# Patient Record
Sex: MALE | Race: WHITE
[De-identification: names, ages, dates, MRNs, and addresses within clinical notes are randomized per-mention and may not be internally consistent; named-entity substitution may affect disease eponyms.]

---

## 2017-01-23 ENCOUNTER — HOSPITAL ENCOUNTER (EMERGENCY)
Dept: HOSPITAL 10 - E/R | Age: 47
Discharge: HOME | End: 2017-01-23
Payer: COMMERCIAL

## 2017-01-23 VITALS
HEIGHT: 72 IN | BODY MASS INDEX: 25.83 KG/M2 | BODY MASS INDEX: 25.83 KG/M2 | WEIGHT: 190.7 LBS | HEIGHT: 72 IN | WEIGHT: 190.7 LBS

## 2017-01-23 VITALS — HEART RATE: 100 BPM | SYSTOLIC BLOOD PRESSURE: 147 MMHG | DIASTOLIC BLOOD PRESSURE: 79 MMHG | RESPIRATION RATE: 18 BRPM

## 2017-01-23 VITALS — TEMPERATURE: 99.3 F

## 2017-01-23 DIAGNOSIS — J06.9: Primary | ICD-10-CM

## 2017-01-23 DIAGNOSIS — R40.2362: ICD-10-CM

## 2017-01-23 DIAGNOSIS — R06.02: ICD-10-CM

## 2017-01-23 DIAGNOSIS — R40.2252: ICD-10-CM

## 2017-01-23 DIAGNOSIS — R40.2142: ICD-10-CM

## 2017-01-23 LAB
ADD UMIC: YES
ALBUMIN SERPL-MCNC: 4.4 G/DL (ref 3.3–4.9)
ALBUMIN/GLOB SERPL: 1.07 {RATIO}
ALP SERPL-CCNC: 87 IU/L (ref 42–121)
ALT SERPL-CCNC: 48 IU/L (ref 13–69)
ANION GAP SERPL CALC-SCNC: 17 MMOL/L (ref 8–16)
APTT BLD: 33.9 SEC (ref 25–35)
AST SERPL-CCNC: 43 IU/L (ref 15–46)
BASOPHILS # BLD AUTO: 0 10^3/UL (ref 0–0.1)
BASOPHILS NFR BLD: 0.3 % (ref 0–2)
BILIRUB DIRECT SERPL-MCNC: 0 MG/DL (ref 0–0.2)
BILIRUB SERPL-MCNC: 0.2 MG/DL (ref 0.2–1.3)
BUN SERPL-MCNC: 13 MG/DL (ref 7–20)
CALCIUM SERPL-MCNC: 8.5 MG/DL (ref 8.4–10.2)
CHLORIDE SERPL-SCNC: 98 MMOL/L (ref 97–110)
CO2 SERPL-SCNC: 29 MMOL/L (ref 21–31)
COLOR UR: YELLOW
CONDITION: 1
CREAT SERPL-MCNC: 0.69 MG/DL (ref 0.61–1.24)
EOSINOPHIL # BLD: 0 10^3/UL (ref 0–0.5)
EOSINOPHIL NFR BLD: 0.2 % (ref 0–7)
ERYTHROCYTE [DISTWIDTH] IN BLOOD BY AUTOMATED COUNT: 13.7 % (ref 11.5–14.5)
GLOBULIN SER-MCNC: 4.1 G/DL (ref 1.3–3.2)
GLUCOSE SERPL-MCNC: 127 MG/DL (ref 70–220)
GLUCOSE UR STRIP-MCNC: NEGATIVE %
HCT VFR BLD CALC: 44.1 % (ref 42–52)
HGB BLD-MCNC: 14.9 G/DL (ref 14–18)
INR PPP: 1.04
KETONES UR STRIP.AUTO-MCNC: NEGATIVE MG/DL
LYMPHOCYTES # BLD AUTO: 1 10^3/UL (ref 0.8–2.9)
LYMPHOCYTES NFR BLD AUTO: 10.7 % (ref 15–51)
MCH RBC QN AUTO: 30 PG (ref 29–33)
MCHC RBC AUTO-ENTMCNC: 33.8 G/DL (ref 32–37)
MCV RBC AUTO: 88.9 FL (ref 82–101)
MONOCYTES # BLD: 0.6 10^3/UL (ref 0.3–0.9)
MONOCYTES NFR BLD: 6.7 % (ref 0–11)
NEUTROPHILS # BLD: 7.5 10^3/UL (ref 1.6–7.5)
NEUTROPHILS NFR BLD AUTO: 82.1 % (ref 39–77)
NITRITE UR QL STRIP.AUTO: NEGATIVE
NRBC # BLD MANUAL: 0 10^3/UL (ref 0–0)
NRBC BLD QL: 0 /100WBC (ref 0–0)
PLATELET # BLD: 192 10^3/UL (ref 140–440)
PMV BLD AUTO: 7 FL (ref 7.4–10.4)
POTASSIUM SERPL-SCNC: 3.8 MMOL/L (ref 3.5–5.1)
PROT SERPL-MCNC: 8.5 G/DL (ref 6.1–8.1)
PROTHROMBIN TIME: 13.6 SEC (ref 12.2–14.2)
PT RATIO: 1.1
RBC # BLD AUTO: 4.96 10^6/UL (ref 4.7–6.1)
RBC # UR AUTO: (no result) /UL
RBC #/AREA URNS HPF: (no result) /HPF
SODIUM SERPL-SCNC: 140 MMOL/L (ref 135–144)
SQUAMOUS #/AREA URNS HPF: (no result) /[HPF]
URATE CRY #/AREA URNS HPF: (no result) /[HPF]
URINE BILIRUBIN (DIP): NEGATIVE
URINE TOTAL PROTEIN (DIP): (no result)
UROBILINOGEN UR STRIP-ACNC: (no result) (ref 0.1–1)
WBC # BLD AUTO: 9.1 10^3/UL (ref 4.8–10.8)
WBC # BLD: 9.1 10^3/UL (ref 4.8–10.8)
WBC # UR STRIP: NEGATIVE /UL

## 2017-01-23 PROCEDURE — 81001 URINALYSIS AUTO W/SCOPE: CPT

## 2017-01-23 PROCEDURE — 85730 THROMBOPLASTIN TIME PARTIAL: CPT

## 2017-01-23 PROCEDURE — 83605 ASSAY OF LACTIC ACID: CPT

## 2017-01-23 PROCEDURE — 93005 ELECTROCARDIOGRAM TRACING: CPT

## 2017-01-23 PROCEDURE — 85610 PROTHROMBIN TIME: CPT

## 2017-01-23 PROCEDURE — 81003 URINALYSIS AUTO W/O SCOPE: CPT

## 2017-01-23 PROCEDURE — 87400 INFLUENZA A/B EACH AG IA: CPT

## 2017-01-23 PROCEDURE — 85025 COMPLETE CBC W/AUTO DIFF WBC: CPT

## 2017-01-23 PROCEDURE — 80053 COMPREHEN METABOLIC PANEL: CPT

## 2017-01-23 PROCEDURE — 87040 BLOOD CULTURE FOR BACTERIA: CPT

## 2017-01-23 PROCEDURE — 36415 COLL VENOUS BLD VENIPUNCTURE: CPT

## 2017-01-23 PROCEDURE — 71010: CPT

## 2017-01-23 PROCEDURE — 84484 ASSAY OF TROPONIN QUANT: CPT

## 2017-01-23 PROCEDURE — 94664 DEMO&/EVAL PT USE INHALER: CPT

## 2017-01-23 PROCEDURE — 87086 URINE CULTURE/COLONY COUNT: CPT

## 2017-01-23 NOTE — RADRPT
PROCEDURE:   XR Chest. 

 

CLINICAL INDICATION:   SOB

 

TECHNIQUE:   Single frontal chest x-ray. 

 

COMPARISON:   12/01/2013 

 

FINDINGS:

The heart does not appear to be grossly enlarged. There is minimal prominence of the lung interstiti
um likely minimal chronic changes. Minimal patchy oval density measuring approximately 1.9 x 0.9 cm 
at the left lung base appearing since previous study which could represent minimal atelectasis or pn
eumonitis.  Degenerative changes in thoracic spine and shoulders.  ECG leads are projected over the 
chest.   

 

IMPRESSION:

 

Minimal patchy oval density measuring approximately 1.9 x 0.9 cm at the left lung base appearing sin
ce previous study which could represent minimal atelectasis or pneumonitis.  Follow-up is recommende
d to exclude lung nodule.

 

 

RPTAT: HJES

_____________________________________________ 

.Roberto Arita MD, MD           Date    Time 

Electronically viewed and signed by .Roberto Arita MD, MD on 01/23/2017 05:00 

 

D:  01/23/2017 05:00  T:  01/23/2017 05:00

.S/

## 2017-01-23 NOTE — ERD
ER Documentation


Chief Complaint


Date/Time


DATE: 1/23/17 


TIME: 05:13


Chief Complaint


fever/SOB/nausea/sore throat x 2 days.





HPI


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 

days.  Cough moderate of yellow sputum.  No chest pain.  No other current 

complaints.  No complaints of chills.





ROS


All systems reviewed and are negative except as per history of present illness.





Medications


Home Meds


Active Scripts


Docusate Sodium* (Colace*) 100 Mg Capsule, 100 MG PO TID, #30 CAP


   Prov:OMAR CALLAHAN NP         11/10/16


Polyethylene Glycol* (Miralax*) 17 Gm Powd.pack, 17 GM PO DAILY, #7


   Prov:OMAR CALLAHAN NP         11/10/16


Hydrocortisone Acetate (Anusol-Hc) 25 Mg Supp.rect, 1 SUPP [place] QHS Y for 

HEMORROID PAIN/ITCHING, #12 SUPP.RECT


   Prov:OMAR CALLAHAN NP         11/10/16


Amox Tr/Potassium Clavulanate (Amox Tr-K Clv 875-125 Mg Tab) 1 Tab Tablet, 1 

TAB PO BID for 7 Days, #20 TAB


   Prov:MYKEL MCCURDY MD         5/16/16


Hydrocortisone Acetate* (Anusol-HC*) 25 Mg/Supp.rect Supp.rect, 1 SUPP [place] BID Y 

for HEMORROID PAIN/ITCHING, #12 SUPP.RECT


   Prov:ANNA MARIE FLOOD DO         5/13/16


Reported Medications


Folic Acid* (Folic Acid*) 1 Mg Tablet, 1 MG PO DAILY, TAB


   5/14/16


Carbamazepine* (Carbamazepine*) 100 Mg Tab.chew, 400 MG PO TID, #90 TAB.CHEW


   5/14/16





Allergies


Allergies:  


Coded Allergies:  


     No Known Drug Allergies (Verified  Allergy, Mild, 4/5/11)





PMhx/Soc


History of Surgery:  Yes (s/p surgery to head )


Anesthesia Reaction:  No


Hx Neurological Disorder:  Yes (seizures since age 1)


Hx Respiratory Disorders:  Yes


Hx Cardiac Disorders:  No


Hx Psychiatric Problems:  No


Hx Miscellaneous Medical Probl:  No


Hx Alcohol Use:  No


Hx Substance Use:  No


Hx Tobacco Use:  No


Smoking Status:  Never smoker





Physical Exam


Vitals





Vital Signs








  Date Time  Temp Pulse Resp B/P Pulse Ox O2 Delivery O2 Flow Rate FiO2


 


1/23/17 04:31     97  2.0 


 


1/23/17 04:31  101 18  97 Nasal Cannula 2.0 


 


1/23/17 04:21      Nasal Cannula 3 


 


1/23/17 03:41 99.3 110  151/97 95 Room Air  


 


1/23/17 03:31 103.1 116 20 154/77 94   








Physical Exam


Const:  []


Head:   Atraumatic 


Eyes:    Normal Conjunctiva


ENT:    Normal External Ears, Nose and Mouth.


Neck:               Full range of motion..~ No meningismus.


Resp:    Clear to auscultation bilaterally


Cardio:    Regular rate and rhythm, no murmurs


Abd:    Soft, non tender, non distended. Normal bowel sounds


Skin:    No petechiae or rashes


Back:    No midline or flank tenderness


Ext:    No cyanosis, or edema


Neur:    Awake and alert


Psych:    Normal Mood and Affect


Result Diagram:  


1/23/17 0418                                                                   

             1/23/17 0418





Results 24 hrs





 Laboratory Tests








Test


  1/23/17


04:18 1/23/17


04:22


 


Activated Partial


Thromboplast Time 33.9Sec 


  


 


 


Alanine Aminotransferase


(ALT/SGPT) 48IU/L 


  


 


 


Albumin 4.4g/dl  


 


Albumin/Globulin Ratio 1.07  


 


Alkaline Phosphatase 87IU/L  


 


Anion Gap 17  


 


Aspartate Amino Transf


(AST/SGOT) 43IU/L 


  


 


 


Basophils # 0.010^3/ul  


 


Basophils % 0.3%  


 


Blood Morphology Comment   


 


Blood Urea Nitrogen 13mg/dl  


 


Calcium Level 8.5mg/dl  


 


Carbon Dioxide Level 29mmol/L  


 


Chloride Level 98mmol/L  


 


Creatinine 0.69mg/dl  


 


Direct Bilirubin 0.00mg/dl  


 


Eosinophils # 0.010^3/ul  


 


Eosinophils % 0.2%  


 


Globulin 4.10g/dl  


 


Glucose Level 127mg/dl  


 


Hematocrit 44.1%  


 


Hemoglobin 14.9g/dl  


 


INR International Normalized


Ratio 1.04 


  


 


 


Indirect Bilirubin 0.2mg/dl  


 


Lymphocytes # 1.010^3/ul  


 


Lymphocytes % 10.7%  


 


Mean Corpuscular Hemoglobin 30.0pg  


 


Mean Corpuscular Hemoglobin


Concent 33.8g/dl 


  


 


 


Mean Corpuscular Volume 88.9fl  


 


Mean Platelet Volume 7.0fl  


 


Monocytes # 0.610^3/ul  


 


Monocytes % 6.7%  


 


Neutrophils # 7.510^3/ul  


 


Neutrophils % 82.1%  


 


Nucleated Red Blood Cells # 0.010^3/ul  


 


Nucleated Red Blood Cells % 0.0/100WBC  


 


Platelet Count 54554^3/UL  


 


Potassium Level 3.8mmol/L  


 


Prothrombin Time 13.6Sec  


 


Prothrombin Time Ratio 1.1  


 


Red Blood Count 4.9610^6/ul  


 


Red Cell Distribution Width 13.7%  


 


Sodium Level 140mmol/L  


 


Total Bilirubin 0.2mg/dl  


 


Total Protein 8.5g/dl  


 


White Blood Count 9.110^3/ul  


 


Urine Bilirubin  NEGATIVE 


 


Urine Clarity  CLEAR 


 


Urine Color  YELLOW 


 


Urine Glucose  NEGATIVE% 


 


Urine Hemoglobin  TRACE 


 


Urine Ketones  NEGATIVE 


 


Urine Leukocyte Esterase  NEGATIVE 


 


Urine Microscopic RBC  Pending 


 


Urine Microscopic WBC  Pending 


 


Urine Nitrite  NEGATIVE 


 


Urine Specific Gravity  1.025 


 


Urine Total Protein  TRACE 


 


Urine Urobilinogen  0.2  E.U./dL 


 


Urine pH  5.0 








 Current Medications








 Medications


  (Trade)  Dose


 Ordered  Sig/Esthela


 Route


 PRN Reason  Start Time


 Stop Time Status Last Admin


Dose Admin


 


 Albuterol


  (Proventil 0.5%


  (Neb))  5 mg  ONCE  STAT


 INH


   1/23/17 04:01


 1/23/17 04:15 DC 1/23/17 04:24


 


 


 Ipratropium


 Bromide


  (Atrovent 0.02%


  (Neb))  0.5 mg  ONCE  STAT


 INH


   1/23/17 04:01


 1/23/17 04:15 DC 1/23/17 04:24


 


 


 Sodium Chloride


  (NS)  2,680 ml  BOLUS OVER 2 HOURS STAT


 IV*


   1/23/17 04:03


 1/23/17 04:15 DC 1/23/17 04:25


 











Procedures/MDM


Medical decision-making: This very pleasant 46-year-old male as well as speech 

bronchitis.  At this point is clinically stable.  We discharged with 

azithromycin, prednisone, albuterol.  Patient to follow-up with primary care 

physician.  Return immediately for return of symptoms.  Follow with primary 

care physician within 24 hours.





Departure


Diagnosis:  


 Primary Impression:  


 Upper respiratory infection


 URI type:  unspecified URI  Qualified Code:  J06.9 - Upper respiratory tract 

infection, unspecified type


Condition:  Stable











DILLON HURTADO Jan 23, 2017 05:13

## 2017-03-09 ENCOUNTER — HOSPITAL ENCOUNTER (EMERGENCY)
Dept: HOSPITAL 10 - E/R | Age: 47
LOS: 1 days | Discharge: HOME | End: 2017-03-10
Payer: COMMERCIAL

## 2017-03-09 VITALS
WEIGHT: 190.7 LBS | HEIGHT: 72 IN | BODY MASS INDEX: 25.83 KG/M2 | BODY MASS INDEX: 25.83 KG/M2 | HEIGHT: 72 IN | WEIGHT: 190.7 LBS

## 2017-03-09 DIAGNOSIS — K64.9: Primary | ICD-10-CM

## 2017-03-09 PROCEDURE — 86900 BLOOD TYPING SEROLOGIC ABO: CPT

## 2017-03-09 PROCEDURE — 80053 COMPREHEN METABOLIC PANEL: CPT

## 2017-03-09 PROCEDURE — 96374 THER/PROPH/DIAG INJ IV PUSH: CPT

## 2017-03-09 PROCEDURE — 85025 COMPLETE CBC W/AUTO DIFF WBC: CPT

## 2017-03-09 PROCEDURE — 86850 RBC ANTIBODY SCREEN: CPT

## 2017-03-09 PROCEDURE — 81003 URINALYSIS AUTO W/O SCOPE: CPT

## 2017-03-09 PROCEDURE — 83690 ASSAY OF LIPASE: CPT

## 2017-03-09 PROCEDURE — 86901 BLOOD TYPING SEROLOGIC RH(D): CPT

## 2017-03-10 VITALS
SYSTOLIC BLOOD PRESSURE: 131 MMHG | RESPIRATION RATE: 20 BRPM | DIASTOLIC BLOOD PRESSURE: 65 MMHG | TEMPERATURE: 98.5 F | HEART RATE: 86 BPM

## 2017-03-10 LAB
ADD SCAN DIFF: NO
ALBUMIN SERPL-MCNC: 4.2 G/DL (ref 3.3–4.9)
ALBUMIN/GLOB SERPL: 1.27 {RATIO}
ALP SERPL-CCNC: 88 IU/L (ref 42–121)
ALT SERPL-CCNC: 48 IU/L (ref 13–69)
ANION GAP SERPL CALC-SCNC: 17 MMOL/L (ref 8–16)
AST SERPL-CCNC: 43 IU/L (ref 15–46)
BILIRUB DIRECT SERPL-MCNC: 0 MG/DL (ref 0–0.2)
BILIRUB SERPL-MCNC: 0.1 MG/DL (ref 0.2–1.3)
BUN SERPL-MCNC: 17 MG/DL (ref 7–20)
CALCIUM SERPL-MCNC: 8.6 MG/DL (ref 8.4–10.2)
CHLORIDE SERPL-SCNC: 103 MMOL/L (ref 97–110)
CO2 SERPL-SCNC: 27 MMOL/L (ref 21–31)
CREAT SERPL-MCNC: 0.76 MG/DL (ref 0.61–1.24)
EOSINOPHIL # BLD: 0.1 10^3/UL (ref 0–0.5)
EOSINOPHIL NFR BLD: 2 % (ref 0–7)
ERYTHROCYTE [DISTWIDTH] IN BLOOD BY AUTOMATED COUNT: 13.2 % (ref 11.5–14.5)
GLOBULIN SER-MCNC: 3.3 G/DL (ref 1.3–3.2)
GLUCOSE SERPL-MCNC: 93 MG/DL (ref 70–220)
HCT VFR BLD CALC: 43.7 % (ref 42–52)
HGB BLD-MCNC: 14.3 G/DL (ref 14–18)
LYMPHOCYTES # BLD AUTO: 3.5 10^3/UL (ref 0.8–2.9)
LYMPHOCYTES NFR BLD AUTO: 56 % (ref 15–51)
MCH RBC QN AUTO: 29.9 PG (ref 29–33)
MCHC RBC AUTO-ENTMCNC: 32.7 G/DL (ref 32–37)
MCV RBC AUTO: 91.2 FL (ref 82–101)
MONOCYTES # BLD: 0.1 10^3/UL (ref 0.3–0.9)
MONOCYTES NFR BLD: 1 % (ref 0–11)
NEUTROPHILS # BLD: 2.6 10^3/UL (ref 1.6–7.5)
NEUTROPHILS NFR BLD AUTO: 41 % (ref 39–77)
PLATELET # BLD: 224 10^3/UL (ref 140–415)
PMV BLD AUTO: 8.6 FL (ref 7.4–10.4)
POTASSIUM SERPL-SCNC: 3.6 MMOL/L (ref 3.5–5.1)
PROT SERPL-MCNC: 7.5 G/DL (ref 6.1–8.1)
RBC # BLD AUTO: 4.79 10^6/UL (ref 4.7–6.1)
SODIUM SERPL-SCNC: 143 MMOL/L (ref 135–144)
TOTAL CELLS COUNTED PERCENT: 100 %
WBC # BLD AUTO: 6.3 10^3/UL (ref 4.8–10.8)

## 2017-03-10 NOTE — ERA
ER Documentation


Chief Complaint


Date/Time


DATE: 3/10/17 


TIME: 00:37


Chief Complaint


mid abd pain w/ bloody stools x 4 days





HPI


The patient is a 46-year-old male, presenting to the ER because of bloody stool 

for the last 4 days, associated with constipation.  He had similar symptoms 

previously due to constipation.  He had colonoscopy in May 2016 that showed 

internal and external hemorrhoids.  He denies fever, chills, dizziness, neck 

pain, chest pain, dyspnea, abdominal pain, vomiting, dysuria.  He does not 

smoke or drink





Past medical history: Internal and external hemorrhoids


Past surgical history: None





ROS


All systems reviewed and are negative except as per history of present illness.





Medications


Home Meds


Active Scripts


Hydrocortisone Acetate (Anusol-Hc) 25 Mg Supp.rect, 1 SUPP [place] BID Y for 

HEMORROID PAIN/ITCHING, #12 SUPP.RECT


   Prov:JAIME VIDAL MD         3/10/17


Polyethylene Glycol* (Miralax*) 17 Gm Powd.pack, 17 GM PO DAILY, #7


   Prov:JAIME VIDAL MD         3/10/17


Dextromethorphan Hb-Promethazine Hcl (Promethazine DM Syrup) 473 Ml Syrup, 5 ML 

PO Q6H Y for COUGH, #4 OZ


   Prov:DILLON HURTADO         1/23/17


Albuterol Sulfate* (Ventolin HFA*) 18 Gm Hfa.aer.ad, 2 PUFF INHALATION Q4H, #1 

INHALER


   Prov:DILLON HURTADO         1/23/17


Prednisone* (Prednisone*) 20 Mg Tab, 40 MG PO DAILY for 4 Days, TAB


   Prov:DILLON HURTADO         1/23/17


Azithromycin* (Zithromax*) 250 Mg Tablet, 250 MG PO .ZPACK AS DIRECTED, #6 TAB


   TAKE 500 MG (2 TABS) THE FIRST DAY THEN 250 MG (1 TAB) DAYS 2-5


   Prov:DILLON HURTADO         1/23/17


Docusate Sodium* (Colace*) 100 Mg Capsule, 100 MG PO TID, #30 CAP


   Prov:OMAR CALLAHAN NP         11/10/16


Polyethylene Glycol* (Miralax*) 17 Gm Powd.pack, 17 GM PO DAILY, #7


   Prov:OMAR CALLAHAN NP         11/10/16


Hydrocortisone Acetate (Anusol-Hc) 25 Mg Supp.rect, 1 SUPP [place] QHS Y for 

HEMORROID PAIN/ITCHING, #12 SUPP.RECT


   Prov:OMAR CALLAHAN NP         11/10/16


Amox Tr/Potassium Clavulanate (Amox Tr-K Clv 875-125 Mg Tab) 1 Tab Tablet, 1 

TAB PO BID for 7 Days, #20 TAB


   Prov:MYKEL MCCURDY MD         5/16/16


Hydrocortisone Acetate* (Anusol-HC*) 25 Mg/Supp.rect Supp.rect, 1 SUPP [place] BID Y 

for HEMORROID PAIN/ITCHING, #12 SUPP.RECT


   Prov:ANNA MARIE FLOOD DO         5/13/16


Reported Medications


Folic Acid* (Folic Acid*) 1 Mg Tablet, 1 MG PO DAILY, TAB


   5/14/16


Carbamazepine* (Carbamazepine*) 100 Mg Tab.chew, 400 MG PO TID, #90 TAB.CHEW


   5/14/16





Allergies


Allergies:  


Coded Allergies:  


     No Known Drug Allergies (Verified  Allergy, Mild, 4/5/11)





PMhx/Soc


History of Surgery:  Yes (s/p surgery to head )


Anesthesia Reaction:  No


Hx Neurological Disorder:  Yes (seizures since age 1)


Hx Respiratory Disorders:  Yes


Hx Cardiac Disorders:  No


Hx Psychiatric Problems:  No


Hx Miscellaneous Medical Probl:  No


Hx Alcohol Use:  No


Hx Substance Use:  No


Hx Tobacco Use:  No


Smoking Status:  Never smoker





Physical Exam


Vitals





Vital Signs








  Date Time  Temp Pulse Resp B/P Pulse Ox O2 Delivery O2 Flow Rate FiO2


 


3/9/17 23:41 98.7 75 20 133/78 98   








Physical Exam


 Const:      No acute distress.


 Head:        Atraumatic.


 Eyes:       Normal Conjunctiva.


 ENT:         Normal External Ears, Nose and Mouth.


 Neck:        Full range of motion.  No meningismus.


 Resp:         Clear to auscultation bilaterally.


 Cardio:       Regular rate and rhythm, no murmurs.


 Abd:         Soft,  non distended, normal bowel sounds, non tender.


 Skin:         No petechiae or rashes.


 Back:        No midline or flank tenderness.


 Ext:          No cyanosis, or edema.


 Neur:        Awake and alert. No focal deficit


 Psych:        Normal Mood and Affect.


Result Diagram:  


3/10/17 0100                                                                   

             3/10/17 0100





Results 24 hrs








 Laboratory Tests








Test


  3/10/17


01:00 3/10/17


02:13


 


Alanine Aminotransferase


(ALT/SGPT) 48IU/L 


  


 


 


Albumin 4.2g/dl  


 


Albumin/Globulin Ratio 1.27  


 


Alkaline Phosphatase 88IU/L  


 


Anion Gap 17  


 


Aspartate Amino Transf


(AST/SGOT) 43IU/L 


  


 


 


Blood Urea Nitrogen 17mg/dl  


 


Calcium Level 8.6mg/dl  


 


Carbon Dioxide Level 27mmol/L  


 


Chloride Level 103mmol/L  


 


Creatinine 0.76mg/dl  


 


Direct Bilirubin 0.00mg/dl  


 


Eosinophils # 0.110^3/ul  


 


Eosinophils % 2.0%  


 


Globulin 3.30g/dl  


 


Glucose Level 93mg/dl  


 


Hematocrit 43.7%  


 


Hemoglobin 14.3g/dl  


 


Indirect Bilirubin 0.1mg/dl  


 


Lipase 74U/L  


 


Lymphocytes # 3.510^3/ul  


 


Lymphocytes % 56.0%  


 


Mean Corpuscular Hemoglobin 29.9pg  


 


Mean Corpuscular Hemoglobin


Concent 32.7g/dl 


  


 


 


Mean Corpuscular Volume 91.2fl  


 


Mean Platelet Volume 8.6fl  


 


Monocytes # 0.110^3/ul  


 


Monocytes % 1.0%  


 


Neutrophils # 2.610^3/ul  


 


Neutrophils % 41.0%  


 


Platelet Count 35336^3/UL  


 


Potassium Level 3.6mmol/L  


 


Red Blood Count 4.7910^6/ul  


 


Red Cell Distribution Width 13.2%  


 


Sodium Level 143mmol/L  


 


Total Bilirubin 0.1mg/dl  


 


Total Protein 7.5g/dl  


 


White Blood Count 6.310^3/ul  


 


Bedside Urine Blood  Negative 


 


Bedside Urine Glucose (UA)  Negative 


 


Bedside Urine Ketones (LAB)  Negative 


 


Bedside Urine Leukocyte


Esterase (L 


  Negative 


 


 


Bedside Urine Nitrite (LAB)  Negative 


 


Bedside Urine Protein (LAB)  1+ 


 


Bedside Urine pH (LAB)  5.5 








 Current Medications








 Medications


  (Trade)  Dose


 Ordered  Sig/Esthela


 Route


 PRN Reason  Start Time


 Stop Time Status Last Admin


Dose Admin


 


 Ketorolac


 Tromethamine


  (Toradol)  30 mg  ONCE  STAT


 IV


   3/10/17 02:04


 3/10/17 02:05 DC 3/10/17 03:20


 














Procedures/MDM


MEDICAL MAKING DECISION: The patient is a 46-year-old male, presenting with 

acute hematochezia due to constipation and hemorrhoids.  He was treated with 

Toradol 30 mg IV for pain with good response.  The differential diagnoses 

considered include but are not limited to cholelithiasis, cholecystitis, 

cystitis, pancreatitis, hepatitis, gastritis, peptic ulcer disease, gastric 

ulcer, appendicitis, diverticulitis, cholangitis, choledocholithiasis, partial 

small bowel obstruction,  carcinoma, polyp, hemorrhoid, fissure, diverticulosis

, angiodysplasia.





Departure


Diagnosis:  


 Primary Impression:  


 Hemorrhoids


 Additional Impression:  


 Constipation


Comments


He was discharged with Anusol suppository and cream and MiraLAX





I discussed the findings with the patient. I advised the patient to follow-up 

with the primary physician in about 1-2 days for referral to see a general 

surgeon for elective hemorrhoidectomy, sooner if needed and return if any 

concern.





The patient's blood pressure was elevated (>120/80) but appears stable without 

evidence of hypertension emergency or urgency.  The patient was counseled about 

the risks of hypertension and urged to pursue outpatient monitoring and therapy 

within a week with their primary care physician.











JAIME VIDAL MD Mar 10, 2017 00:37

## 2019-08-08 ENCOUNTER — HOSPITAL ENCOUNTER (EMERGENCY)
Dept: HOSPITAL 91 - E/R | Age: 49
LOS: 1 days | Discharge: ANOTHER HOSPITAL | End: 2019-08-09
Payer: COMMERCIAL

## 2019-08-08 ENCOUNTER — HOSPITAL ENCOUNTER (EMERGENCY)
Dept: HOSPITAL 10 - E/R | Age: 49
LOS: 1 days | Discharge: ANOTHER HOSPITAL | End: 2019-08-09
Payer: COMMERCIAL

## 2019-08-08 VITALS
HEIGHT: 68 IN | BODY MASS INDEX: 27.2 KG/M2 | WEIGHT: 179.46 LBS | WEIGHT: 179.46 LBS | HEIGHT: 68 IN | BODY MASS INDEX: 27.2 KG/M2

## 2019-08-08 DIAGNOSIS — R40.2362: ICD-10-CM

## 2019-08-08 DIAGNOSIS — R40.2142: ICD-10-CM

## 2019-08-08 DIAGNOSIS — I10: ICD-10-CM

## 2019-08-08 DIAGNOSIS — R40.2252: ICD-10-CM

## 2019-08-08 DIAGNOSIS — R26.0: ICD-10-CM

## 2019-08-08 DIAGNOSIS — I63.9: Primary | ICD-10-CM

## 2019-08-08 LAB
ADD MAN DIFF?: YES
ALANINE AMINOTRANSFERASE: 53 IU/L (ref 13–69)
ALBUMIN/GLOBULIN RATIO: 1.31
ALBUMIN: 4.2 G/DL (ref 3.3–4.9)
ALKALINE PHOSPHATASE: 81 IU/L (ref 42–121)
ANION GAP: 4 (ref 5–13)
ANISOCYTOSIS: (no result) (ref 0–0)
ASPARTATE AMINO TRANSFERASE: 40 IU/L (ref 15–46)
BAND NEUTROPHILS #M: 0 10^3/UL (ref 0–0.6)
BAND NEUTROPHILS % (M): 1 % (ref 0–4)
BILIRUBIN,DIRECT: 0 MG/DL (ref 0–0.2)
BILIRUBIN,TOTAL: 0.3 MG/DL (ref 0.2–1.3)
BLOOD UREA NITROGEN: 9 MG/DL (ref 7–20)
CALCIUM: 8.9 MG/DL (ref 8.4–10.2)
CARBON DIOXIDE: 31 MMOL/L (ref 21–31)
CHLORIDE: 103 MMOL/L (ref 97–110)
CREATININE: 0.55 MG/DL (ref 0.61–1.24)
EOSINOPHILS % (M): 2 % (ref 0–7)
GIANT THROMBO% (M): 1 % (ref 0–0)
GLOBULIN: 3.2 G/DL (ref 1.3–3.2)
GLUCOSE: 93 MG/DL (ref 70–220)
HEMATOCRIT: 43.7 % (ref 42–52)
HEMOGLOBIN: 14.3 G/DL (ref 14–18)
IMMATURE GRANS #M: 0.01 10^3/UL (ref 0–0.03)
IMMATURE GRANS % (M): 0.2 % (ref 0–0.43)
LIPASE: 72 U/L (ref 23–300)
LYMPHOCYTES #M: 2.2 10^3/UL (ref 0.8–2.9)
LYMPHOCYTES % (M): 45 % (ref 15–51)
MACROCYTOSIS: (no result) (ref 0–0)
MEAN CORPUSCULAR HEMOGLOBIN: 30 PG (ref 29–33)
MEAN CORPUSCULAR HGB CONC: 32.7 G/DL (ref 32–37)
MEAN CORPUSCULAR VOLUME: 91.6 FL (ref 82–101)
MEAN PLATELET VOLUME: 8.3 FL (ref 7.4–10.4)
MONOCYTE #M: 0.1 10^3/UL (ref 0.3–0.9)
MONOCYTES % (M): 3 % (ref 0–11)
NUCLEATED RED BLOOD CELLS%: 0 /100WBC (ref 0–0)
PLATELET COUNT: 221 10^3/UL (ref 140–415)
PLATELET ESTIMATE: NORMAL
POIKILOCYTOSIS: (no result) (ref 0–0)
POLYCHROMASIA: (no result) (ref 0–0)
POTASSIUM: 4.3 MMOL/L (ref 3.5–5.1)
REACTIVE LYMPHOCYTES #M: 0.7 10^3/UL (ref 0–0)
REACTIVE LYMPHOCYTES% (M): 15 % (ref 0–0)
RED BLOOD COUNT: 4.77 10^6/UL (ref 4.7–6.1)
RED CELL DISTRIBUTION WIDTH: 12.6 % (ref 11.5–14.5)
SEG NEUT #M: 1.7 10^3/UL (ref 1.6–7.5)
SEGMENTED NEUTROPHILS (M) %: 34 % (ref 39–77)
SMUDGE%M: 4 % (ref 0–0)
SODIUM: 138 MMOL/L (ref 135–144)
TOTAL PROTEIN: 7.4 G/DL (ref 6.1–8.1)
TROPONIN-I: < 0.012 NG/ML (ref 0–0.12)
WHITE BLOOD COUNT: 4.9 10^3/UL (ref 4.8–10.8)

## 2019-08-08 PROCEDURE — 80053 COMPREHEN METABOLIC PANEL: CPT

## 2019-08-08 PROCEDURE — 84484 ASSAY OF TROPONIN QUANT: CPT

## 2019-08-08 PROCEDURE — 70450 CT HEAD/BRAIN W/O DYE: CPT

## 2019-08-08 PROCEDURE — 83690 ASSAY OF LIPASE: CPT

## 2019-08-08 PROCEDURE — 93005 ELECTROCARDIOGRAM TRACING: CPT

## 2019-08-08 PROCEDURE — 96374 THER/PROPH/DIAG INJ IV PUSH: CPT

## 2019-08-08 PROCEDURE — 36415 COLL VENOUS BLD VENIPUNCTURE: CPT

## 2019-08-08 PROCEDURE — 85025 COMPLETE CBC W/AUTO DIFF WBC: CPT

## 2019-08-08 PROCEDURE — 71045 X-RAY EXAM CHEST 1 VIEW: CPT

## 2019-08-08 PROCEDURE — 99285 EMERGENCY DEPT VISIT HI MDM: CPT

## 2019-08-08 RX ADMIN — THIAMINE HYDROCHLORIDE 1 MLS/HR: 100 INJECTION, SOLUTION INTRAMUSCULAR; INTRAVENOUS at 20:40

## 2019-08-08 RX ADMIN — ONDANSETRON HYDROCHLORIDE 1 MG: 2 INJECTION, SOLUTION INTRAMUSCULAR; INTRAVENOUS at 20:40

## 2019-08-08 RX ADMIN — ASPIRIN 81 MG CHEWABLE TABLET 1 MG: 81 TABLET CHEWABLE at 22:38

## 2019-08-08 NOTE — ERD
ER Documentation


Chief Complaint


Chief Complaint





DIZZINESS AND DOUBLE-VISION X 2 DAYS.





HPI


This is a 49-year-old man with history of seizure disorder complaining of 2 days


of generalized weakness, dizziness, blurry vision and some nausea.  He had a 


tonic-clonic seizure episode a few days ago but states he has been using his 


medications as prescribed, which include carbamazepine and lamotrigine.  He 


states he usually has about one seizure every 1 to 2 months.  He denies fevers 


or chills, no blood per rectum or melena, no URI symptoms, no complaints of neck


or back pain, no chest pain or shortness of breath.  Patient denies loss of 


consciousness.





ROS


All systems reviewed and are negative except as per history of present illness.





Medications


Home Meds


Active Scripts


Hydrocortisone Acetate (Anusol-Hc) 25 Mg Supp.rect, 1 SUPP IA BID PRN for 


HEMORROID PAIN/ITCHING, #12 SUPP.RECT


   Prov:JAIME VIDAL MD         3/10/17


Polyethylene Glycol* (Miralax*) 17 Gm Powd.pack, 17 GM PO DAILY, #7


   Prov:JAIME VIDAL MD         3/10/17


Dextromethorphan Hb-Promethazine Hcl (Promethazine DM Syrup) 473 Ml Syrup, 5 ML 


PO Q6H PRN for COUGH, #4 OZ


   Prov:DILLON HURTADO         1/23/17


Albuterol Sulfate* (Ventolin HFA*) 18 Gm Hfa.aer.ad, 2 PUFF INHALATION Q4H, #1 


INHALER


   Prov:DILLON HURTADO         1/23/17


Prednisone* (Prednisone*) 20 Mg Tab, 40 MG PO DAILY for 4 Days, TAB


   Prov:DILLON HURTADO         1/23/17


Azithromycin* (Zithromax*) 250 Mg Tablet, 250 MG PO .ZPACK AS DIRECTED, #6 TAB


   TAKE 500 MG (2 TABS) THE FIRST DAY THEN 250 MG (1 TAB) DAYS 2-5


   Prov:DILLON HURTADO         1/23/17


Docusate Sodium* (Colace*) 100 Mg Capsule, 100 MG PO TID, #30 CAP


   Prov:OMAR CALLAHAN NP         11/10/16


Polyethylene Glycol* (Miralax*) 17 Gm Powd.pack, 17 GM PO DAILY, #7


   Prov:OMAR CALLAHAN NP         11/10/16


Hydrocortisone Acetate (Anusol-Hc) 25 Mg Supp.rect, 1 SUPP IA QHS PRN for 


HEMORROID PAIN/ITCHING, #12 SUPP.RECT


   Prov:OMAR CALLAHAN NP         11/10/16


Amox Tr/Potassium Clavulanate (Amox Tr-K Clv 875-125 Mg Tab) 1 Tab Tablet, 1 TAB


PO BID for 7 Days, #20 TAB


   Prov:MYKEL MCCURDY MD         5/16/16


Hydrocortisone Acetate* (Anusol-HC*) 25 Mg/Supp.rect Supp.rect, 1 SUPP IA BID 


PRN for HEMORROID PAIN/ITCHING, #12 SUPP.RECT


   Prov:ANNA MARIE FLOOD DO         5/13/16


Reported Medications


Folic Acid* (Folic Acid*) 1 Mg Tablet, 1 MG PO DAILY, TAB


   5/14/16


Carbamazepine* (Carbamazepine*) 100 Mg Tab.chew, 400 MG PO TID, #90 TAB.CHEW


   5/14/16





Allergies


Allergies:  


Coded Allergies:  


     No Known Drug Allergies (Verified  Allergy, Mild, 4/5/11)





PMhx/Soc


Chronic constipation, seizure disorder


History of Surgery:  Yes (s/p surgery to head )


Anesthesia Reaction:  No


Hx Neurological Disorder:  Yes (seizures since age 1)


Hx Respiratory Disorders:  Yes


Hx Cardiac Disorders:  Yes (HTN)


Hx Psychiatric Problems:  No


Hx Miscellaneous Medical Probl:  No


Hx Alcohol Use:  No


Hx Substance Use:  No


Hx Tobacco Use:  No


Smoking Status:  Never smoker





FmHx


Family History:  No diabetes





Physical Exam


Vitals





Vital Signs


  Date      Temp  Pulse  Resp  B/P (MAP)   Pulse Ox  O2          O2 Flow    FiO2


Time                                                 Delivery    Rate


    8/8/19           64    26      105/68        98  Room Air


     20:32                           (80)


    8/8/19  98.6     70    18      122/70        97


     19:27                           (87)





Physical Exam


GENERAL: Well-developed, well-nourished, well-hydrated, in no apparent distress,


looks nontoxic in appearance


HEENT: Moist mucous membranes, pink conjunctiva, no cervical spine tenderness or


step-off deformities, no goiter, no jaundice or icterus, extraocular movements 


intact without pain. No submandibular induration, and no pharyngeal erythema


NEURO: Alert and oriented 3, cranial nerves II through XII intact bilaterally, 


pupils equal round reactive to light, no focal deficits or facial asymmetry, 


positive Romberg's test, finger-to-nose exam within normal limits, heel-to-shin 


testing normal bilaterally, patient has gait ataxia


CARDIAC: Bradycardic and regular, no murmurs rubs or gallops


LUNGS: Clear bilaterally no wheezing crackles or stridor


ABDOMEN: Soft nontender, no guarding, no rigidity, no rebound, no psoas sign no 


obturator sign. Normoactive bowel sounds


SKIN: Warm and dry to touch, no abrasions, contusions, or hematomas, no 


lacerations, no ecchymosis, no target lesions, and without ulcers


EXTREMITIES: No clubbing cyanosis or edema, calves are bilaterally symmetrical, 


no Homans sign, no popliteal cord sign. Distal pulses equal and bilateral


PSYCH: Normal affect without agitation or irritability


Result Diagram:  


8/8/19 2037 8/8/19 2037





Results 24 hrs





Laboratory Tests


              Test
                                  8/8/19
20:37


              White Blood Count                      4.9 10^3/ul


              Red Blood Count                       4.77 10^6/ul


              Hemoglobin                               14.3 g/dl


              Hematocrit                                  43.7 %


              Mean Corpuscular Volume                    91.6 fl


              Mean Corpuscular Hemoglobin                30.0 pg


              Mean Corpuscular Hemoglobin
Concent     32.7 g/dl 



              Red Cell Distribution Width                 12.6 %


              Platelet Count                         221 10^3/UL


              Mean Platelet Volume                        8.3 fl


              Immature Granulocytes %                    0.200 %


              Neutrophils %                         %


              Segmented Neutrophils %
(Manual)             34 % 



              Band Neutrophils % (Manual)                    1 %


              Lymphocytes %                         %


              Lymphocytes % (Manual)                        45 %


              Reactive Lymphocytes %
(Manual)              15 % 



              Monocytes %                           %


              Monocytes % (Manual)                           3 %


              Eosinophils %                         %


              Eosinophils % (Manual)                         2 %


              Basophils %                           %


              Nucleated Red Blood Cells %            0.0 /100WBC


              Immature Granulocytes #              0.010 10^3/ul


              Neutrophils #                         10^3/ul


              Neutrophils # (Manual)                 1.7 10^3/ul


              Band Neutrophils #                     0.0 10^3/ul


              Lymphocytes (Manual)                   2.2 10^3/ul


              Lymphocytes #                         10^3/ul


              Reactive Lymphocytes #                 0.7 10^3/ul


              Monocytes #                           10^3/ul


              Monocytes # (Manual)                   0.1 10^3/ul


              Eosinophils #                         10^3/ul


              Basophils #                           10^3/ul


              Nucleated Red Blood Cells #           10^3/ul


              Platelet Estimate                    NORMAL


              Giant Platelets                                1 %


              Polychromasia                                   1+


              Poikilocytosis                                  3+


              Anisocytosis                                    2+


              Macrocytosis                                    2+


              Sodium Level                            138 mmol/L


              Potassium Level                         4.3 mmol/L


              Chloride Level                          103 mmol/L


              Carbon Dioxide Level                     31 mmol/L


              Anion Gap                                        4


              Blood Urea Nitrogen                        9 mg/dl


              Creatinine                              0.55 mg/dl


              Est Glomerular Filtrat Rate
mL/min   > 60 mL/min 



              Glucose Level                             93 mg/dl


              Calcium Level                            8.9 mg/dl


              Total Bilirubin                          0.3 mg/dl


              Direct Bilirubin                        0.00 mg/dl


              Indirect Bilirubin                       0.3 mg/dl


              Aspartate Amino Transf
(AST/SGOT)         40 IU/L 



              Alanine Aminotransferase
(ALT/SGPT)       53 IU/L 



              Alkaline Phosphatase                       81 IU/L


              Troponin I                           < 0.012 ng/ml


              Total Protein                             7.4 g/dl


              Albumin                                   4.2 g/dl


              Globulin                                 3.20 g/dl


              Albumin/Globulin Ratio                        1.31


              Lipase                                      72 U/L





Current Medications


 Medications
   Dose
          Sig/Esthela
       Start Time
   Status  Last


 (Trade)       Ordered        Route
 PRN     Stop Time              Admin
Dose


                              Reason                                Admin


 Sodium         1,000 ml @ 
   Q1H STAT
      8/8/19        DC            8/8/19


Chloride       1,000 mls/hr   IV
            20:34
 8/8/19                20:40



                                             21:33


 Ondansetron    4 mg           ONCE  STAT
    8/8/19        DC            8/8/19


HCl
  (Zofran                 IV
            20:34
 8/8/19                20:40



Inj)                                         20:35


 Aspirin
       324 mg         ONCE  ONCE
    8/8/19                 



(Aspirin)                     PO
            22:30
 8/8/19


                                             22:31








Procedures/MDM


IV line was established patient was placed on cardiac monitor rhythm strip 


revealed a narrow complex bradycardia at about 60 bpm with upright P and T 


waves.  Patient was afebrile





One AP view of the chest performed, read by me reveals no acute infiltrates, 


normal mediastinum, sharp costophrenic and cardiac borders, no air under the 


diaphragm. Otherwise unremarkable chest x-ray.





CT scan of the brain was performed was negative for acute bleed mass or shift.  


Although chronic deformity noted consistent with patient's history of a fall 


from a height balcony at 2 years of age





EKG performed, read by me revealed a sinus bradycardia 57 bpm, normal axis, 


right ventricular conduction delay QRS duration 108 ms, no concerning ST 


elevations or depressions noted





I administered 1 L normal saline IV and Zofran 4 mg IV





CBC and electrolytes are normal, liver function tests were normal, troponin was 


negative





Critical Care:


   Time:             36 minutes, this was time separate from other billable 


procedures.


   Treatments/Evaluations:   Close monitoring and treatment of unstable vital 


signs, cardiorespiratory, and neurologic status, while maintaining tight balance


of fluid, respiratory, and cardiac interventions.





I administered aspirin 324 mg p.o. for neuro protective measures.  Patient has 


gait ataxia and Romberg's test is positive, blurry vision x2 days concerning for


posterior circulation stroke or basilar artery insufficiency versus thrombus.  


Patient will require admission to telemetry setting for neurology consultation 


and brain MRI.





Departure


Diagnosis:  


   Primary Impression:  


   Dizziness


   Additional Impressions:  


   Stroke


   CVA mechanism:  unspecified  Qualified Codes:  I63.9 - Cerebral infarction, 


   unspecified


   Basilar artery insufficiency


   Ataxic gait


Condition:  TRINI Olson MD              Aug 8, 2019 21:07

## 2019-08-09 VITALS — HEART RATE: 76 BPM | SYSTOLIC BLOOD PRESSURE: 114 MMHG | RESPIRATION RATE: 18 BRPM | DIASTOLIC BLOOD PRESSURE: 69 MMHG

## 2019-08-09 RX ADMIN — MONTELUKAST SODIUM 1 MG: 10 TABLET, FILM COATED ORAL at 02:02

## 2019-08-09 RX ADMIN — LAMOTRIGINE 1 MG: 25 TABLET ORAL at 02:02

## 2019-08-09 RX ADMIN — CARBAMAZEPINE 1 MG: 200 TABLET ORAL at 02:02

## 2019-09-28 ENCOUNTER — HOSPITAL ENCOUNTER (EMERGENCY)
Dept: HOSPITAL 10 - E/R | Age: 49
LOS: 1 days | Discharge: HOME | End: 2019-09-29
Payer: COMMERCIAL

## 2019-09-28 ENCOUNTER — HOSPITAL ENCOUNTER (EMERGENCY)
Dept: HOSPITAL 91 - E/R | Age: 49
LOS: 1 days | Discharge: HOME | End: 2019-09-29
Payer: COMMERCIAL

## 2019-09-28 VITALS
BODY MASS INDEX: 28.84 KG/M2 | WEIGHT: 179.46 LBS | HEIGHT: 66 IN | WEIGHT: 179.46 LBS | BODY MASS INDEX: 28.84 KG/M2 | HEIGHT: 66 IN

## 2019-09-28 DIAGNOSIS — I10: ICD-10-CM

## 2019-09-28 DIAGNOSIS — R31.9: Primary | ICD-10-CM

## 2019-09-28 PROCEDURE — 96374 THER/PROPH/DIAG INJ IV PUSH: CPT

## 2019-09-28 PROCEDURE — 81001 URINALYSIS AUTO W/SCOPE: CPT

## 2019-09-28 PROCEDURE — 99285 EMERGENCY DEPT VISIT HI MDM: CPT

## 2019-09-28 PROCEDURE — 87086 URINE CULTURE/COLONY COUNT: CPT

## 2019-09-28 PROCEDURE — 36415 COLL VENOUS BLD VENIPUNCTURE: CPT

## 2019-09-28 PROCEDURE — 85025 COMPLETE CBC W/AUTO DIFF WBC: CPT

## 2019-09-28 PROCEDURE — 83690 ASSAY OF LIPASE: CPT

## 2019-09-28 PROCEDURE — 96375 TX/PRO/DX INJ NEW DRUG ADDON: CPT

## 2019-09-28 PROCEDURE — 74176 CT ABD & PELVIS W/O CONTRAST: CPT

## 2019-09-28 PROCEDURE — 80053 COMPREHEN METABOLIC PANEL: CPT

## 2019-09-29 VITALS — DIASTOLIC BLOOD PRESSURE: 63 MMHG | HEART RATE: 58 BPM | RESPIRATION RATE: 20 BRPM | SYSTOLIC BLOOD PRESSURE: 101 MMHG

## 2019-09-29 RX ADMIN — ONDANSETRON HYDROCHLORIDE 1 MG: 2 INJECTION, SOLUTION INTRAMUSCULAR; INTRAVENOUS at 00:28

## 2019-09-29 RX ADMIN — KETOROLAC TROMETHAMINE 1 MG: 15 INJECTION, SOLUTION INTRAMUSCULAR; INTRAVENOUS at 00:28

## 2019-09-29 RX ADMIN — THIAMINE HYDROCHLORIDE 1 MLS/HR: 100 INJECTION, SOLUTION INTRAMUSCULAR; INTRAVENOUS at 00:28
